# Patient Record
Sex: MALE | Race: WHITE | NOT HISPANIC OR LATINO | URBAN - METROPOLITAN AREA
[De-identification: names, ages, dates, MRNs, and addresses within clinical notes are randomized per-mention and may not be internally consistent; named-entity substitution may affect disease eponyms.]

---

## 2020-02-18 ENCOUNTER — APPOINTMENT (RX ONLY)
Dept: URBAN - METROPOLITAN AREA CLINIC 70 | Facility: CLINIC | Age: 75
Setting detail: DERMATOLOGY
End: 2020-02-18

## 2020-02-18 ENCOUNTER — APPOINTMENT (RX ONLY)
Dept: URBAN - METROPOLITAN AREA CLINIC 71 | Facility: CLINIC | Age: 75
Setting detail: DERMATOLOGY
End: 2020-02-18

## 2020-02-18 DIAGNOSIS — L81.4 OTHER MELANIN HYPERPIGMENTATION: ICD-10-CM

## 2020-02-18 DIAGNOSIS — L30.8 OTHER SPECIFIED DERMATITIS: ICD-10-CM

## 2020-02-18 DIAGNOSIS — L30.0 NUMMULAR DERMATITIS: ICD-10-CM

## 2020-02-18 PROCEDURE — 96372 THER/PROPH/DIAG INJ SC/IM: CPT

## 2020-02-18 PROCEDURE — ? INTRAMUSCULAR KENALOG

## 2020-02-18 PROCEDURE — 99202 OFFICE O/P NEW SF 15 MIN: CPT | Mod: 25

## 2020-02-18 PROCEDURE — ? TREATMENT REGIMEN

## 2020-02-18 PROCEDURE — ? COUNSELING

## 2020-02-18 ASSESSMENT — LOCATION DETAILED DESCRIPTION DERM
LOCATION DETAILED: SUPRAPUBIC SKIN
LOCATION DETAILED: RIGHT BUTTOCK
LOCATION DETAILED: RIGHT DISTAL VENTRAL THUMB
LOCATION DETAILED: RIGHT DISTAL VENTRAL THUMB
LOCATION DETAILED: MID-FRONTAL SCALP
LOCATION DETAILED: SUPRAPUBIC SKIN
LOCATION DETAILED: RIGHT BUTTOCK
LOCATION DETAILED: MID-FRONTAL SCALP
LOCATION DETAILED: LEFT DISTAL VENTRAL THUMB
LOCATION DETAILED: LEFT DISTAL VENTRAL THUMB

## 2020-02-18 ASSESSMENT — LOCATION SIMPLE DESCRIPTION DERM
LOCATION SIMPLE: LEFT THUMB
LOCATION SIMPLE: GROIN
LOCATION SIMPLE: RIGHT BUTTOCK
LOCATION SIMPLE: ANTERIOR SCALP
LOCATION SIMPLE: RIGHT BUTTOCK
LOCATION SIMPLE: ANTERIOR SCALP
LOCATION SIMPLE: RIGHT THUMB
LOCATION SIMPLE: RIGHT THUMB
LOCATION SIMPLE: GROIN
LOCATION SIMPLE: LEFT THUMB

## 2020-02-18 ASSESSMENT — LOCATION ZONE DERM
LOCATION ZONE: SCALP
LOCATION ZONE: FINGER
LOCATION ZONE: TRUNK
LOCATION ZONE: FINGER
LOCATION ZONE: TRUNK
LOCATION ZONE: SCALP

## 2020-02-18 NOTE — PROCEDURE: INTRAMUSCULAR KENALOG
Kenalog Preparation: kenalog
Add Option For Additional Mediation: No
Concentration (Mg/Ml): 40.0
Total Volume (Ccs): 2
Consent: The risks of atrophy were reviewed with the patient.
Concentration (Mg/Ml) Of Additional Medication: 2.5
Detail Level: Detailed

## 2020-02-18 NOTE — PROCEDURE: TREATMENT REGIMEN
Initiate Treatment: Vanicream hc
Plan: Reviewed etiology and treatment options. Start treatment regimen above. Advised patient to To apply topical and cover with gloves at bedtime
Detail Level: Zone

## 2020-02-18 NOTE — PROCEDURE: TREATMENT REGIMEN
Plan: Reviewed etiology and treatment options. Start treatment regimen above. Advised patient to To apply topical and cover with gloves at bedtime
Initiate Treatment: Vanicream hc
Detail Level: Zone

## 2020-02-18 NOTE — PROCEDURE: INTRAMUSCULAR KENALOG
Concentration (Mg/Ml): 40.0
Total Volume (Ccs): 2
Add Option For Additional Mediation: No
Detail Level: Detailed
Kenalog Preparation: kenalog
Concentration (Mg/Ml) Of Additional Medication: 2.5
Consent: The risks of atrophy were reviewed with the patient.

## 2020-03-10 ENCOUNTER — RX ONLY (OUTPATIENT)
Age: 75
Setting detail: RX ONLY
End: 2020-03-10

## 2020-03-10 ENCOUNTER — APPOINTMENT (RX ONLY)
Dept: URBAN - METROPOLITAN AREA CLINIC 71 | Facility: CLINIC | Age: 75
Setting detail: DERMATOLOGY
End: 2020-03-10

## 2020-03-10 ENCOUNTER — APPOINTMENT (RX ONLY)
Dept: URBAN - METROPOLITAN AREA CLINIC 70 | Facility: CLINIC | Age: 75
Setting detail: DERMATOLOGY
End: 2020-03-10

## 2020-03-10 DIAGNOSIS — L30.8 OTHER SPECIFIED DERMATITIS: ICD-10-CM

## 2020-03-10 PROCEDURE — ? TREATMENT REGIMEN

## 2020-03-10 PROCEDURE — 99213 OFFICE O/P EST LOW 20 MIN: CPT

## 2020-03-10 PROCEDURE — ? COUNSELING

## 2020-03-10 RX ORDER — CLOBETASOL PROPIONATE 0.5 MG/G
FTU OINTMENT TOPICAL
Qty: 1 | Refills: 1 | Status: ERX | COMMUNITY
Start: 2020-03-10

## 2020-03-10 RX ORDER — FLURANDRENOLIDE 4 UG/CM2
1 TAPE TOPICAL
Qty: 1 | Refills: 0 | Status: ERX | COMMUNITY
Start: 2020-03-10